# Patient Record
Sex: MALE | Race: WHITE | Employment: OTHER | URBAN - METROPOLITAN AREA
[De-identification: names, ages, dates, MRNs, and addresses within clinical notes are randomized per-mention and may not be internally consistent; named-entity substitution may affect disease eponyms.]

---

## 2017-01-06 ENCOUNTER — APPOINTMENT (OUTPATIENT)
Dept: GENERAL RADIOLOGY | Facility: HOSPITAL | Age: 52
End: 2017-01-06
Attending: PHYSICIAN ASSISTANT
Payer: MEDICARE

## 2017-01-06 ENCOUNTER — HOSPITAL ENCOUNTER (EMERGENCY)
Facility: HOSPITAL | Age: 52
Discharge: HOME | End: 2017-01-06
Attending: EMERGENCY MEDICINE
Payer: MEDICARE

## 2017-01-06 ENCOUNTER — APPOINTMENT (OUTPATIENT)
Dept: CT IMAGING | Facility: HOSPITAL | Age: 52
End: 2017-01-06
Attending: PHYSICIAN ASSISTANT
Payer: MEDICARE

## 2017-01-06 VITALS
HEIGHT: 66 IN | TEMPERATURE: 98 F | SYSTOLIC BLOOD PRESSURE: 113 MMHG | WEIGHT: 150 LBS | HEART RATE: 75 BPM | OXYGEN SATURATION: 100 % | RESPIRATION RATE: 22 BRPM | BODY MASS INDEX: 24.11 KG/M2 | DIASTOLIC BLOOD PRESSURE: 72 MMHG

## 2017-01-06 DIAGNOSIS — S20.229A BACK CONTUSION, UNSPECIFIED LATERALITY, INITIAL ENCOUNTER: ICD-10-CM
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
## 2017-01-06 DIAGNOSIS — S09.90XA HEAD INJURY, INITIAL ENCOUNTER: Primary | ICD-10-CM

## 2017-01-06 LAB
ALBUMIN SERPL BCP-MCNC: 4 G/DL (ref 3.5–4.8)
ALBUMIN/GLOB SERPL: 1.5 {RATIO} (ref 1–2)
ALP SERPL-CCNC: 71 U/L (ref 32–100)
ALT SERPL-CCNC: 35 U/L (ref 17–63)
ANION GAP SERPL CALC-SCNC: 8 MMOL/L (ref 0–18)
AST SERPL-CCNC: 32 U/L (ref 15–41)
BASOPHILS # BLD: 0 K/UL (ref 0–0.2)
BASOPHILS NFR BLD: 1 %
BILIRUB SERPL-MCNC: 0.2 MG/DL (ref 0.3–1.2)
BILIRUB UR QL: NEGATIVE
BUN SERPL-MCNC: 19 MG/DL (ref 8–20)
BUN/CREAT SERPL: 17.9 (ref 10–20)
CALCIUM SERPL-MCNC: 9.1 MG/DL (ref 8.5–10.5)
CHLORIDE SERPL-SCNC: 104 MMOL/L (ref 95–110)
CLARITY UR: CLEAR
CO2 SERPL-SCNC: 30 MMOL/L (ref 22–32)
COLOR UR: COLORLESS
CREAT SERPL-MCNC: 1.06 MG/DL (ref 0.5–1.5)
EOSINOPHIL # BLD: 0 K/UL (ref 0–0.7)
EOSINOPHIL NFR BLD: 1 %
ERYTHROCYTE [DISTWIDTH] IN BLOOD BY AUTOMATED COUNT: 12.8 % (ref 11–15)
GLOBULIN PLAS-MCNC: 2.7 G/DL (ref 2.5–3.7)
GLUCOSE SERPL-MCNC: 79 MG/DL (ref 70–99)
GLUCOSE UR-MCNC: NEGATIVE MG/DL
HCT VFR BLD AUTO: 39.1 % (ref 41–52)
HGB BLD-MCNC: 13 G/DL (ref 13.5–17.5)
HGB UR QL STRIP.AUTO: NEGATIVE
KETONES UR-MCNC: NEGATIVE MG/DL
LEUKOCYTE ESTERASE UR QL STRIP.AUTO: NEGATIVE
LYMPHOCYTES # BLD: 0.7 K/UL (ref 1–4)
LYMPHOCYTES NFR BLD: 19 %
MCH RBC QN AUTO: 32.6 PG (ref 27–32)
MCHC RBC AUTO-ENTMCNC: 33.2 G/DL (ref 32–37)
MCV RBC AUTO: 98.1 FL (ref 80–100)
MONOCYTES # BLD: 0.4 K/UL (ref 0–1)
MONOCYTES NFR BLD: 11 %
NEUTROPHILS # BLD AUTO: 2.6 K/UL (ref 1.8–7.7)
NEUTROPHILS NFR BLD: 69 %
NITRITE UR QL STRIP.AUTO: NEGATIVE
OSMOLALITY UR CALC.SUM OF ELEC: 295 MOSM/KG (ref 275–295)
PH UR: 7 [PH] (ref 5–8)
PLATELET # BLD AUTO: 133 K/UL (ref 140–400)
PMV BLD AUTO: 7.3 FL (ref 7.4–10.3)
POTASSIUM SERPL-SCNC: 3.7 MMOL/L (ref 3.3–5.1)
PROT SERPL-MCNC: 6.7 G/DL (ref 5.9–8.4)
PROT UR-MCNC: NEGATIVE MG/DL
RBC # BLD AUTO: 3.99 M/UL (ref 4.5–5.9)
SODIUM SERPL-SCNC: 142 MMOL/L (ref 136–144)
SP GR UR STRIP: 1 (ref 1–1.03)
TROPONIN I SERPL-MCNC: 0 NG/ML (ref ?–0.03)
UROBILINOGEN UR STRIP-ACNC: <2
VIT C UR-MCNC: NEGATIVE MG/DL
WBC # BLD AUTO: 3.8 K/UL (ref 4–11)

## 2017-01-06 PROCEDURE — 70450 CT HEAD/BRAIN W/O DYE: CPT

## 2017-01-06 PROCEDURE — 80053 COMPREHEN METABOLIC PANEL: CPT | Performed by: PHYSICIAN ASSISTANT

## 2017-01-06 PROCEDURE — 84484 ASSAY OF TROPONIN QUANT: CPT | Performed by: PHYSICIAN ASSISTANT

## 2017-01-06 PROCEDURE — 85025 COMPLETE CBC W/AUTO DIFF WBC: CPT | Performed by: PHYSICIAN ASSISTANT

## 2017-01-06 PROCEDURE — 72100 X-RAY EXAM L-S SPINE 2/3 VWS: CPT

## 2017-01-06 PROCEDURE — 72072 X-RAY EXAM THORAC SPINE 3VWS: CPT

## 2017-01-06 PROCEDURE — 93010 ELECTROCARDIOGRAM REPORT: CPT | Performed by: PHYSICIAN ASSISTANT

## 2017-01-06 PROCEDURE — 99285 EMERGENCY DEPT VISIT HI MDM: CPT

## 2017-01-06 PROCEDURE — 81001 URINALYSIS AUTO W/SCOPE: CPT | Performed by: PHYSICIAN ASSISTANT

## 2017-01-06 PROCEDURE — 71010 XR CHEST AP PORTABLE  (CPT=71010): CPT

## 2017-01-06 PROCEDURE — 72125 CT NECK SPINE W/O DYE: CPT

## 2017-01-06 PROCEDURE — 36415 COLL VENOUS BLD VENIPUNCTURE: CPT

## 2017-01-06 PROCEDURE — 93005 ELECTROCARDIOGRAM TRACING: CPT

## 2017-01-06 NOTE — ED PROVIDER NOTES
Patient Seen in: [facility] AND [facility] Emergency Department    History   Patient presents with:  Fall (musculoskeletal, neurologic)    Stated Complaint: fall    The history is provided by a caregiver.    48 yom MR, autism, bipolar, HTN reportedly having 2 fal Alum & Mag Hydroxide-Simeth [phone] MG/5ML Oral Suspension,  Take 5 mL by mouth 4 (four) times daily as needed for Indigestion. magnesium hydroxide 400 MG/5ML Oral Suspension,  Take 60 mL by mouth daily as needed for constipation.        Family Histor Pulmonary/Chest: Effort normal and breath sounds normal. No respiratory distress. He has no decreased breath sounds. He has no wheezes. He has no rales. He exhibits no tenderness, no bony tenderness and no deformity. Abdominal: Soft. Normal appearance.  [facility] [address] 2. Mild levoscoliosis and multilevel dorsal lumbar spondylosis.   3. Moderate-large amount of stool throughout the colon consistent with constipation/fecal retention.                 Narrative:     PROCEDURE: XR LUMBAR SPINE (MIN 2 VIEWS) (CPT=72100)     CO VERTEBRAL BODIES:   Multilevel thoracic spondylosis. Bulky anterior endplate osteophyte formation T11-12.  Chronic wedge configuration mid and lower thoracic vertebra including T3-T12.  OTHER: Generalized osteopenia     Dictated by (CST): Carlene Chawla C2-C3: Chronic in the fused left facet and a coexistent solid bony fusion of the posterolateral facet. Mild spondylotic disc bulge. No significant stenosis.   C3-C4: Decrease in disc height with a spondylotic disc bulge and minimal degenerative retrolisthes BRAINSTEM: No edema, hemorrhage, mass, acute infarction, or significant atrophy.    CALVARIUM: No apparent fracture, mass, or other significant visible lesion.    SINUSES: Limited views demonstrate no significant mucosal thickening or fluid.    ORBITS: Baird The accuracy of the MDRD equation is not suitable for acute renal failure patients and it is not recommended for use with pregnant women.                             TROPONIN I, 0 HOUR (Final result) Component (Lab Inquiry)       Collection Time Result Amy Rutherford Head injury, initial encounter  (primary encounter diagnosis)  Back contusion, unspecified laterality, initial encounter  Fall, initial encounter    Disposition:  Discharge    Follow-up:    Primary care MD for recheck in 1-2 days  Schedule an appointment a

## 2017-01-06 NOTE — ED NOTES
Pt brought in by caregiver for a c/o of an unwitnessed fall last night. Pt unsure of LOC but states he hit his head. No hematoma or laceration noted to head. Ecchymosis noted on pt's back, site tender and painful to touch.

## 2019-03-26 ENCOUNTER — HOSPITAL ENCOUNTER (EMERGENCY)
Facility: HOSPITAL | Age: 54
Discharge: HOME | End: 2019-03-26
Payer: MEDICARE

## 2019-03-26 VITALS
TEMPERATURE: 98 F | DIASTOLIC BLOOD PRESSURE: 66 MMHG | HEART RATE: 60 BPM | SYSTOLIC BLOOD PRESSURE: 101 MMHG | OXYGEN SATURATION: 100 % | RESPIRATION RATE: 18 BRPM

## 2019-03-26 DIAGNOSIS — Z77.098 CHEMICAL EXPOSURE OF EYE: Primary | ICD-10-CM

## 2019-03-26 PROCEDURE — 99283 EMERGENCY DEPT VISIT LOW MDM: CPT

## 2019-03-26 NOTE — ED NOTES
Assumed care of pt alert to his norm. Caregiver at bedside as pt is special needs. Per caregiver pt washes his face multiples times a day with hand . Pt has old abrasion noted to his forehead.  Caregiver said that that was from him hitting his head

## 2019-03-26 NOTE — ED INITIAL ASSESSMENT (HPI)
Pt here with caretaker, c/o pain to eyes bilaterally after attempting to \"wash his face\" with hand  and it splashed in his eyes. Pt is awake and alert, no redness or tearing noted.  Pt unable to express vision changes due to dx of \"intellectual

## 2019-03-26 NOTE — ED PROVIDER NOTES
Patient Seen in: [facility] AND [facility] Emergency Department    History   CC: eye exposure  HPI: Jazmyn Streeter 46year old male w/ hx Autism, bipolar, HTN, seizures who presents to the ER c/o bilat eye irritation s/p incident today in which pt was washing h breakfast.   Oyster Shell Calcium 500 MG Oral Tab,  Take 500 mg by mouth 3 (three) times daily. Pyridoxine HCl (VITAMIN B-6) 50 MG Oral Tab,  Take 50 mg by mouth daily. Losartan Potassium 25 MG Oral Tab,  Take 25 mg by mouth daily.    Alum & Mag Hydroxi plan of care. She advises that there are no family members to notify at this time.       Disposition and Plan     Clinical Impression:  Chemical exposure of eye  (primary encounter diagnosis)    Disposition:  Discharge    Follow-up:  Orly Saini MD  250

## 2019-03-27 ENCOUNTER — APPOINTMENT (OUTPATIENT)
Dept: GENERAL RADIOLOGY | Facility: HOSPITAL | Age: 54
End: 2019-03-27
Attending: EMERGENCY MEDICINE
Payer: MEDICARE

## 2019-03-27 ENCOUNTER — HOSPITAL ENCOUNTER (EMERGENCY)
Facility: HOSPITAL | Age: 54
Discharge: HOME | End: 2019-03-27
Attending: EMERGENCY MEDICINE
Payer: MEDICARE

## 2019-03-27 VITALS
TEMPERATURE: 98 F | BODY MASS INDEX: 24 KG/M2 | DIASTOLIC BLOOD PRESSURE: 63 MMHG | SYSTOLIC BLOOD PRESSURE: 111 MMHG | HEART RATE: 52 BPM | OXYGEN SATURATION: 99 % | WEIGHT: 149.94 LBS | RESPIRATION RATE: 20 BRPM

## 2019-03-27 DIAGNOSIS — S60.221A CONTUSION OF RIGHT HAND, INITIAL ENCOUNTER: Primary | ICD-10-CM

## 2019-03-27 PROCEDURE — 99283 EMERGENCY DEPT VISIT LOW MDM: CPT

## 2019-03-27 PROCEDURE — 73130 X-RAY EXAM OF HAND: CPT | Performed by: EMERGENCY MEDICINE

## 2019-03-27 NOTE — ED NOTES
Received pt awake, alert and baseline per pt, clear speech, nad, no resp distress  Here with c/o fall from standing with walker and r/o injury to R 5th digit. Redness noted to knuckle, very small skin tear noted, no active bleeding.  Cms intact, cap refil

## 2019-03-27 NOTE — ED PROVIDER NOTES
Patient Seen in: [facility] AND [facility] Emergency Department    History   Patient presents with:  Upper Extremity Injury (musculoskeletal)    Stated Complaint: R hand, 5th finger pain post fall     HPI    12-year-old male with history of autism presents with Right Ear: External ear normal.   Left Ear: External ear normal.   Nose: Nose normal. No sinus tenderness, nasal deformity or nasal septal hematoma.    Mouth/Throat: Uvula is midline, oropharynx is clear and moist and mucous membranes are normal.   Eyes: Co Neurological: He is alert. He has normal strength. No cranial nerve deficit or sensory deficit. Coordination and gait normal. GCS eye subscore is 3. Skin: Skin is warm, dry and intact. Psychiatric: He has a normal mood and affect.  His speech is normal. Clinical impression as well as any lab results and radiology findings were discussed with the patient. Patient is advised to follow up with PCP for reevaluation. Return precautions were given.  Patient voices understanding and agreement with the treatment p

## 2019-03-27 NOTE — ED INITIAL ASSESSMENT (HPI)
Pt to ER with witnessed fall today. Pt caregiver here with patient and states he was walking with walker and fell to right side injuring right hand. +swelling noted to right 5th finger. Caregiver states pt did not hit head and denies LOC.

## 2020-02-23 ENCOUNTER — HOSPITAL ENCOUNTER (EMERGENCY)
Facility: HOSPITAL | Age: 55
Discharge: HOME | End: 2020-02-23
Attending: EMERGENCY MEDICINE
Payer: MEDICARE

## 2020-02-23 VITALS
RESPIRATION RATE: 18 BRPM | SYSTOLIC BLOOD PRESSURE: 129 MMHG | TEMPERATURE: 98 F | OXYGEN SATURATION: 98 % | WEIGHT: 149.94 LBS | BODY MASS INDEX: 24 KG/M2 | DIASTOLIC BLOOD PRESSURE: 82 MMHG | HEART RATE: 75 BPM

## 2020-02-23 DIAGNOSIS — S01.01XA SCALP LACERATION, INITIAL ENCOUNTER: Primary | ICD-10-CM

## 2020-02-23 PROCEDURE — 90471 IMMUNIZATION ADMIN: CPT

## 2020-02-23 PROCEDURE — 99283 EMERGENCY DEPT VISIT LOW MDM: CPT

## 2020-02-23 PROCEDURE — 12002 RPR S/N/AX/GEN/TRNK2.6-7.5CM: CPT

## 2020-02-24 NOTE — ED NOTES
Staff member unable to consent to Tdap for patient, patient is not able to make decisions. Unable to get ahold of POA. Per staff member, ok to provide dc instructions with follow up for staple removal and updated tetanus shot.

## 2020-02-24 NOTE — ED PROVIDER NOTES
Patient Seen in: [facility] AND [facility] Emergency Department    History   Patient presents with:  Laceration Abrasion    Stated Complaint: Head injury after hitting head on wall prior to arrival     HPI    68-year-old male with past medical history of autism, h Hydroxide-Simeth [phone] MG/5ML Oral Suspension,  Take 5 mL by mouth 4 (four) times daily as needed for Indigestion. magnesium hydroxide 400 MG/5ML Oral Suspension,  Take 60 mL by mouth daily as needed for constipation.        Family History   Family h Course   Labs Reviewed - No data to display  Laceration repair:  Verbal consent was obtained from the caregiver. The 3 cm laceration on the scalp was anesthetized in the usual fashion.  The wound was scrubbed, draped and explored to its base with a gloved

## 2020-07-29 ENCOUNTER — HOSPITAL ENCOUNTER (EMERGENCY)
Facility: HOSPITAL | Age: 55
Discharge: NURSING, REHAB OR LONG-TERM CARE | End: 2020-07-29
Attending: EMERGENCY MEDICINE
Payer: MEDICARE

## 2020-07-29 VITALS
OXYGEN SATURATION: 98 % | WEIGHT: 150 LBS | SYSTOLIC BLOOD PRESSURE: 117 MMHG | HEIGHT: 68 IN | TEMPERATURE: 98 F | DIASTOLIC BLOOD PRESSURE: 78 MMHG | RESPIRATION RATE: 18 BRPM | BODY MASS INDEX: 22.73 KG/M2 | HEART RATE: 69 BPM

## 2020-07-29 DIAGNOSIS — T50.901A ACCIDENTAL DRUG INGESTION, INITIAL ENCOUNTER: Primary | ICD-10-CM

## 2020-07-29 PROCEDURE — 99283 EMERGENCY DEPT VISIT LOW MDM: CPT

## 2020-07-29 NOTE — ED NOTES
Poison control called: Spoke with Duane Horner. Caregiver on way- per poison control, monitor him for tachycardia/hypotension. No labs needed- no EKG- supportive management. \"We would not expect any issues with 300 mg.\" Watch pt in ED for 4 hours.      Call wi

## 2020-07-30 NOTE — ED PROVIDER NOTES
Patient Seen in: [facility] AND [facility] Emergency Department      History   Patient presents with:  Medication Reaction    Stated Complaint: medication error- pt received 300 mg seroquel of another pt per EMS- acting \"mo*    HPI    Presents the emergency dep Normal range of motion. General: No tenderness. Skin:     General: Skin is warm and dry. Findings: No rash. Neurological:      General: No focal deficit present. Mental Status: He is alert and oriented to person, place, and time.

## (undated) NOTE — ED AVS SNAPSHOT
[facility] Emergency Department    [place]     [place] [address]    Phone:  [phone]    Fax:  [phone]           Hyacinth Herrera   MRN: [id number]    Department:  [facility] Emergency Department   Date of Visit:  1/6/ aspect of your visit today. In an effort to constantly improve our service to you, we would appreciate any positive or negative feedback related to the care you received in our emergency department. Please call our [address] [facility] [facility] at [phone].   Your Debra contact you. Please make sure we have your correct phone number on file.       I certified that I have received a copy of the aftercare instructions; that these instructions have been explained to me; all questions pertaining to these instructions have bee Enter your [employer] Activation Code exactly as it appears below along with your Zip Code and Date of Birth to complete the sign-up process. If you do not sign up before the expiration date, you must request a new code.     Your unique [employer] Access Code: [email or web address]

## (undated) NOTE — ED AVS SNAPSHOT
[facility] Emergency Department    [place]     [place] [address]    Phone:  [phone]    Fax:  [phone]           Arthur Lara   MRN: [id number]    Department:  [facility] Emergency Department   Date of Visit:  1/6/ and Class Registration line at [phone] or find a doctor online by visiting [email or web address].    IF THERE IS ANY CHANGE OR WORSENING OF YOUR CONDITION, CALL YOUR PRIMARY CARE PHYSICIAN AT ONCE OR RETURN IMMEDIATELY TO [address] [place] [address].     If

## (undated) NOTE — ED AVS SNAPSHOT
Giovani Bose   MRN: [id number]    Department:  [facility] Emergency Department   Date of Visit:  2/23/2020           Disclosure     Insurance plans vary and the physician(s) referred by the ER may not be covered by your plan.  Please contact within the next three months to obtain basic health screening including reassessment of your blood pressure.     IF THERE IS ANY CHANGE OR WORSENING OF YOUR CONDITION, CALL YOUR PRIMARY CARE PHYSICIAN AT ONCE OR RETURN IMMEDIATELY TO THE EMERGENCY DEPARTMEN

## (undated) NOTE — ED AVS SNAPSHOT
Ruben Santiago   MRN: [id number]    Department:  [facility] Emergency Department   Date of Visit:  3/27/2019           Disclosure     Insurance plans vary and the physician(s) referred by the ER may not be covered by your plan.  Please contact within the next three months to obtain basic health screening including reassessment of your blood pressure.     IF THERE IS ANY CHANGE OR WORSENING OF YOUR CONDITION, CALL YOUR PRIMARY CARE PHYSICIAN AT ONCE OR RETURN IMMEDIATELY TO THE EMERGENCY DEPARTMEN

## (undated) NOTE — ED AVS SNAPSHOT
Michael Blanco   MRN: [id number]    Department:  [facility] Emergency Department   Date of Visit:  3/26/2019           Disclosure     Insurance plans vary and the physician(s) referred by the ER may not be covered by your plan.  Please contact within the next three months to obtain basic health screening including reassessment of your blood pressure.     IF THERE IS ANY CHANGE OR WORSENING OF YOUR CONDITION, CALL YOUR PRIMARY CARE PHYSICIAN AT ONCE OR RETURN IMMEDIATELY TO THE EMERGENCY DEPARTMEN